# Patient Record
Sex: FEMALE | Race: WHITE | NOT HISPANIC OR LATINO | Employment: UNEMPLOYED | URBAN - METROPOLITAN AREA
[De-identification: names, ages, dates, MRNs, and addresses within clinical notes are randomized per-mention and may not be internally consistent; named-entity substitution may affect disease eponyms.]

---

## 2017-05-27 ENCOUNTER — APPOINTMENT (OUTPATIENT)
Dept: RADIOLOGY | Facility: MEDICAL CENTER | Age: 27
End: 2017-05-27
Attending: EMERGENCY MEDICINE
Payer: COMMERCIAL

## 2017-05-27 ENCOUNTER — HOSPITAL ENCOUNTER (EMERGENCY)
Facility: MEDICAL CENTER | Age: 27
End: 2017-05-27
Attending: EMERGENCY MEDICINE
Payer: COMMERCIAL

## 2017-05-27 VITALS
HEART RATE: 71 BPM | BODY MASS INDEX: 18.61 KG/M2 | DIASTOLIC BLOOD PRESSURE: 76 MMHG | TEMPERATURE: 98.4 F | SYSTOLIC BLOOD PRESSURE: 104 MMHG | WEIGHT: 94.8 LBS | OXYGEN SATURATION: 99 % | RESPIRATION RATE: 16 BRPM | HEIGHT: 60 IN

## 2017-05-27 DIAGNOSIS — S09.90XA CHI (CLOSED HEAD INJURY), INITIAL ENCOUNTER: ICD-10-CM

## 2017-05-27 DIAGNOSIS — S16.1XXA NECK STRAIN, INITIAL ENCOUNTER: ICD-10-CM

## 2017-05-27 LAB — HCG UR QL: NEGATIVE

## 2017-05-27 PROCEDURE — 73501 X-RAY EXAM HIP UNI 1 VIEW: CPT | Mod: RT

## 2017-05-27 PROCEDURE — 72125 CT NECK SPINE W/O DYE: CPT

## 2017-05-27 PROCEDURE — 90715 TDAP VACCINE 7 YRS/> IM: CPT | Performed by: EMERGENCY MEDICINE

## 2017-05-27 PROCEDURE — 81025 URINE PREGNANCY TEST: CPT

## 2017-05-27 PROCEDURE — 700111 HCHG RX REV CODE 636 W/ 250 OVERRIDE (IP): Performed by: EMERGENCY MEDICINE

## 2017-05-27 PROCEDURE — 70450 CT HEAD/BRAIN W/O DYE: CPT

## 2017-05-27 PROCEDURE — 99284 EMERGENCY DEPT VISIT MOD MDM: CPT

## 2017-05-27 PROCEDURE — 90471 IMMUNIZATION ADMIN: CPT

## 2017-05-27 RX ORDER — OXYCODONE HYDROCHLORIDE AND ACETAMINOPHEN 5; 325 MG/1; MG/1
1-2 TABLET ORAL EVERY 4 HOURS PRN
Qty: 15 TAB | Refills: 0 | Status: SHIPPED | OUTPATIENT
Start: 2017-05-27

## 2017-05-27 RX ADMIN — CLOSTRIDIUM TETANI TOXOID ANTIGEN (FORMALDEHYDE INACTIVATED), CORYNEBACTERIUM DIPHTHERIAE TOXOID ANTIGEN (FORMALDEHYDE INACTIVATED), BORDETELLA PERTUSSIS TOXOID ANTIGEN (GLUTARALDEHYDE INACTIVATED), BORDETELLA PERTUSSIS FILAMENTOUS HEMAGGLUTININ ANTIGEN (FORMALDEHYDE INACTIVATED), BORDETELLA PERTUSSIS PERTACTIN ANTIGEN, AND BORDETELLA PERTUSSIS FIMBRIAE 2/3 ANTIGEN 0.5 ML: 5; 2; 2.5; 5; 3; 5 INJECTION, SUSPENSION INTRAMUSCULAR at 22:20

## 2017-05-27 ASSESSMENT — PAIN SCALES - GENERAL: PAINLEVEL_OUTOF10: 8

## 2017-05-27 NOTE — ED AVS SNAPSHOT
Home Care Instructions                                                                                                                Alli Razo   MRN: [id number]    Department:  [facility], Emergency Dept   Date of Visit:  5/27/2017            [facility], Emergency Dept    [address] [place] [id number]    Phone:  [phone]      You were seen by     Ricardo Crowder M.D.      Your Diagnosis Was     CHI (closed head injury), initial encounter     S09.90XA       These are the medications you received during your hospitalization from 05/27/2017 2007 to 05/27/2017 2321     Date/Time Order Dose Route Action    05/27/2017 2220 tetanus-dipth-acell pertussis (ADACEL) inj 0.5 mL 0.5 mL Intramuscular Given      Follow-up Information     1. Follow up with [facility], Emergency Dept In 2 days.    Specialty:  Emergency Medicine    Why:  As needed, If symptoms worsen    Contact information    [address] [place] [id number] [phone]      Medication Information     Review all of your home medications and newly ordered medications with your primary doctor and/or pharmacist as soon as possible. Follow medication instructions as directed by your doctor and/or pharmacist.     Please keep your complete medication list with you and share with your physician. Update the information when medications are discontinued, doses are changed, or new medications (including over-the-counter products) are added; and carry medication information at all times in the event of emergency situations.               Medication List      START taking these medications        Instructions    Morning Afternoon Evening Bedtime    oxycodone-acetaminophen 5-325 MG Tabs   Commonly known as:  PERCOCET        Take 1-2 Tabs by mouth every four hours as needed (pain).   Dose:  1-2 Tab                          ASK your doctor about these medications        Instructions    Morning Afternoon Evening Bedtime    NON SPECIFIED        Indications: primrose                             Where to Get Your Medications      You can get these medications from any pharmacy     Bring a paper prescription for each of these medications    - oxycodone-acetaminophen 5-325 MG Tabs            Procedures and tests performed during your visit     CT-CSPINE WITHOUT PLUS RECONS    CT-HEAD W/O    DX-HIP-UNILATERAL-WITH PELVIS-1 VIEW RIGHT    POC URINE PREGNANCY        Discharge Instructions       Cervical Sprain  A cervical sprain is an injury in the neck in which the strong, fibrous tissues (ligaments) that connect your neck bones stretch or tear. Cervical sprains can range from mild to severe. Severe cervical sprains can cause the neck vertebrae to be unstable. This can lead to damage of the spinal cord and can result in serious nervous system problems. The amount of time it takes for a cervical sprain to get better depends on the cause and extent of the injury. Most cervical sprains heal in 1 to 3 weeks.  CAUSES   Severe cervical sprains may be caused by:   · Contact sport injuries (such as from football, rugby, wrestling, hockey, auto racing, gymnastics, diving, martial arts, or boxing).    · Motor vehicle collisions.    · Whiplash injuries. This is an injury from a sudden forward and backward whipping movement of the head and neck.   · Falls.    Mild cervical sprains may be caused by:   · Being in an awkward position, such as while cradling a telephone between your ear and shoulder.    · Sitting in a chair that does not offer proper support.    · Working at a poorly designed computer station.    · Looking up or down for long periods of time.    SYMPTOMS   · Pain, soreness, stiffness, or a burning sensation in the front, back, or sides of the neck. This discomfort may develop immediately after the injury or slowly, 24 hours or more after the injury.    · Pain or tenderness directly in the middle of the  back of the neck.    · Shoulder or upper back pain.    · Limited ability to move the neck.    · Headache.    · Dizziness.    · Weakness, numbness, or tingling in the hands or arms.    · Muscle spasms.    · Difficulty swallowing or chewing.    · Tenderness and swelling of the neck.    DIAGNOSIS   Most of the time your health care provider can diagnose a cervical sprain by taking your history and doing a physical exam. Your health care provider will ask about previous neck injuries and any known neck problems, such as arthritis in the neck. X-rays may be taken to find out if there are any other problems, such as with the bones of the neck. Other tests, such as a CT scan or MRI, may also be needed.   TREATMENT   Treatment depends on the severity of the cervical sprain. Mild sprains can be treated with rest, keeping the neck in place (immobilization), and pain medicines. Severe cervical sprains are immediately immobilized. Further treatment is done to help with pain, muscle spasms, and other symptoms and may include:  · Medicines, such as pain relievers, numbing medicines, or muscle relaxants.    · Physical therapy. This may involve stretching exercises, strengthening exercises, and posture training. Exercises and improved posture can help stabilize the neck, strengthen muscles, and help stop symptoms from returning.    HOME CARE INSTRUCTIONS   · Put ice on the injured area.    ¨ Put ice in a plastic bag.    ¨ Place a towel between your skin and the bag.    ¨ Leave the ice on for 15-20 minutes, 3-4 times a day.    · If your injury was severe, you may have been given a cervical collar to wear. A cervical collar is a two-piece collar designed to keep your neck from moving while it heals.  ¨ Do not remove the collar unless instructed by your health care provider.  ¨ If you have long hair, keep it outside of the collar.  ¨ Ask your health care provider before making any adjustments to your collar. Minor adjustments may be  required over time to improve comfort and reduce pressure on your chin or on the back of your head.  ¨ If you are allowed to remove the collar for cleaning or bathing, follow your health care provider's instructions on how to do so safely.  ¨ Keep your collar clean by wiping it with mild soap and water and drying it completely. If the collar you have been given includes removable pads, remove them every 1-2 days and hand wash them with soap and water. Allow them to air dry. They should be completely dry before you wear them in the collar.  ¨ If you are allowed to remove the collar for cleaning and bathing, wash and dry the skin of your neck. Check your skin for irritation or sores. If you see any, tell your health care provider.  ¨ Do not drive while wearing the collar.    · Only take over-the-counter or prescription medicines for pain, discomfort, or fever as directed by your health care provider.    · Keep all follow-up appointments as directed by your health care provider.    · Keep all physical therapy appointments as directed by your health care provider.    · Make any needed adjustments to your workstation to promote good posture.    · Avoid positions and activities that make your symptoms worse.    · Warm up and stretch before being active to help prevent problems.    SEEK MEDICAL CARE IF:   · Your pain is not controlled with medicine.    · You are unable to decrease your pain medicine over time as planned.    · Your activity level is not improving as expected.    SEEK IMMEDIATE MEDICAL CARE IF:   · You develop any bleeding.  · You develop stomach upset.  · You have signs of an allergic reaction to your medicine.    · Your symptoms get worse.    · You develop new, unexplained symptoms.    · You have numbness, tingling, weakness, or paralysis in any part of your body.    MAKE SURE YOU:   · Understand these instructions.  · Will watch your condition.  · Will get help right away if you are not doing well or get  worse.     This information is not intended to replace advice given to you by your health care provider. Make sure you discuss any questions you have with your health care provider.     Document Released: 10/14/2008 Document Revised: 12/23/2014 Document Reviewed: 06/25/2014  [employer] Interactive Patient Education ©2016 [employer]    Concussion, Adult  A concussion, or closed-head injury, is a brain injury caused by a direct blow to the head or by a quick and sudden movement (jolt) of the head or neck. Concussions are usually not life-threatening. Even so, the effects of a concussion can be serious. If you have had a concussion before, you are more likely to experience concussion-like symptoms after a direct blow to the head.   CAUSES  · Direct blow to the head, such as from running into another player during a soccer game, being hit in a fight, or hitting your head on a hard surface.  · A jolt of the head or neck that causes the brain to move back and forth inside the skull, such as in a car crash.  SIGNS AND SYMPTOMS  The signs of a concussion can be hard to notice. Early on, they may be missed by you, family members, and health care providers. You may look fine but act or feel differently.  Symptoms are usually temporary, but they may last for days, weeks, or even longer. Some symptoms may appear right away while others may not show up for hours or days. Every head injury is different. Symptoms include:  · Mild to moderate headaches that will not go away.  · A feeling of pressure inside your head.  · Having more trouble than usual:  ¨ Learning or remembering things you have heard.  ¨ Answering questions.  ¨ Paying attention or concentrating.  ¨ Organizing daily tasks.  ¨ Making decisions and solving problems.  · Slowness in thinking, acting or reacting, speaking, or reading.  · Getting lost or being easily confused.  · Feeling tired all the time or lacking energy (fatigued).  · Feeling drowsy.  · Sleep  disturbances.  ¨ Sleeping more than usual.  ¨ Sleeping less than usual.  ¨ Trouble falling asleep.  ¨ Trouble sleeping (insomnia).  · Loss of balance or feeling lightheaded or dizzy.  · Nausea or vomiting.  · Numbness or tingling.  · Increased sensitivity to:  ¨ Sounds.  ¨ Lights.  ¨ Distractions.  · Vision problems or eyes that tire easily.  · Diminished sense of taste or smell.  · Ringing in the ears.  · Mood changes such as feeling sad or anxious.  · Becoming easily irritated or angry for little or no reason.  · Lack of motivation.  · Seeing or hearing things other people do not see or hear (hallucinations).  DIAGNOSIS  Your health care provider can usually diagnose a concussion based on a description of your injury and symptoms. He or she will ask whether you passed out (lost consciousness) and whether you are having trouble remembering events that happened right before and during your injury.  Your evaluation might include:  · A brain scan to look for signs of injury to the brain. Even if the test shows no injury, you may still have a concussion.  · Blood tests to be sure other problems are not present.  TREATMENT  · Concussions are usually treated in an emergency department, in urgent care, or at a clinic. You may need to stay in the hospital overnight for further treatment.  · Tell your health care provider if you are taking any medicines, including prescription medicines, over-the-counter medicines, and natural remedies. Some medicines, such as blood thinners (anticoagulants) and aspirin, may increase the chance of complications. Also tell your health care provider whether you have had alcohol or are taking illegal drugs. This information may affect treatment.  · Your health care provider will send you home with important instructions to follow.  · How fast you will recover from a concussion depends on many factors. These factors include how severe your concussion is, what part of your brain was injured,  your age, and how healthy you were before the concussion.  · Most people with mild injuries recover fully. Recovery can take time. In general, recovery is slower in older persons. Also, persons who have had a concussion in the past or have other medical problems may find that it takes longer to recover from their current injury.  HOME CARE INSTRUCTIONS  General Instructions  · Carefully follow the directions your health care provider gave you.  · Only take over-the-counter or prescription medicines for pain, discomfort, or fever as directed by your health care provider.  · Take only those medicines that your health care provider has approved.  · Do not drink alcohol until your health care provider says you are well enough to do so. Alcohol and certain other drugs may slow your recovery and can put you at risk of further injury.  · If it is harder than usual to remember things, write them down.  · If you are easily distracted, try to do one thing at a time. For example, do not try to watch TV while fixing dinner.  · Talk with family members or close friends when making important decisions.  · Keep all follow-up appointments. Repeated evaluation of your symptoms is recommended for your recovery.  · Watch your symptoms and tell others to do the same. Complications sometimes occur after a concussion. Older adults with a brain injury may have a higher risk of serious complications, such as a blood clot on the brain.  · Tell your teachers, school nurse, school counselor, , , or  about your injury, symptoms, and restrictions. Tell them about what you can or cannot do. They should watch for:  ¨ Increased problems with attention or concentration.  ¨ Increased difficulty remembering or learning new information.  ¨ Increased time needed to complete tasks or assignments.  ¨ Increased irritability or decreased ability to cope with stress.  ¨ Increased symptoms.  · Rest. Rest helps the brain to  heal. Make sure you:  ¨ Get plenty of sleep at night. Avoid staying up late at night.  ¨ Keep the same bedtime hours on weekends and weekdays.  ¨ Rest during the day. Take daytime naps or rest breaks when you feel tired.  · Limit activities that require a lot of thought or concentration. These include:  ¨ Doing homework or job-related work.  ¨ Watching TV.  ¨ Working on the computer.  · Avoid any situation where there is potential for another head injury (football, hockey, soccer, basketball, martial arts, downhill snow sports and horseback riding). Your condition will get worse every time you experience a concussion. You should avoid these activities until you are evaluated by the appropriate follow-up health care providers.  Returning To Your Regular Activities  You will need to return to your normal activities slowly, not all at once. You must give your body and brain enough time for recovery.  · Do not return to sports or other athletic activities until your health care provider tells you it is safe to do so.  · Ask your health care provider when you can drive, ride a bicycle, or operate heavy machinery. Your ability to react may be slower after a brain injury. Never do these activities if you are dizzy.  · Ask your health care provider about when you can return to work or school.  Preventing Another Concussion  It is very important to avoid another brain injury, especially before you have recovered. In rare cases, another injury can lead to permanent brain damage, brain swelling, or death. The risk of this is greatest during the first 7-10 days after a head injury. Avoid injuries by:  · Wearing a seat belt when riding in a car.  · Drinking alcohol only in moderation.  · Wearing a helmet when biking, skiing, skateboarding, skating, or doing similar activities.  · Avoiding activities that could lead to a second concussion, such as contact or recreational sports, until your health care provider says it is  okay.  · Taking safety measures in your home.  ¨ Remove clutter and tripping hazards from floors and stairways.  ¨ Use grab bars in bathrooms and handrails by stairs.  ¨ Place non-slip mats on floors and in bathtubs.  ¨ Improve lighting in dim areas.  SEEK MEDICAL CARE IF:  · You have increased problems paying attention or concentrating.  · You have increased difficulty remembering or learning new information.  · You need more time to complete tasks or assignments than before.  · You have increased irritability or decreased ability to cope with stress.  · You have more symptoms than before.  Seek medical care if you have any of the following symptoms for more than 2 weeks after your injury:  · Lasting (chronic) headaches.  · Dizziness or balance problems.  · Nausea.  · Vision problems.  · Increased sensitivity to noise or light.  · Depression or mood swings.  · Anxiety or irritability.  · Memory problems.  · Difficulty concentrating or paying attention.  · Sleep problems.  · Feeling tired all the time.  SEEK IMMEDIATE MEDICAL CARE IF:  · You have severe or worsening headaches. These may be a sign of a blood clot in the brain.  · You have weakness (even if only in one hand, leg, or part of the face).  · You have numbness.  · You have decreased coordination.  · You vomit repeatedly.  · You have increased sleepiness.  · One pupil is larger than the other.  · You have convulsions.  · You have slurred speech.  · You have increased confusion. This may be a sign of a blood clot in the brain.  · You have increased restlessness, agitation, or irritability.  · You are unable to recognize people or places.  · You have neck pain.  · It is difficult to wake you up.  · You have unusual behavior changes.  · You lose consciousness.  MAKE SURE YOU:  · Understand these instructions.  · Will watch your condition.  · Will get help right away if you are not doing well or get worse.     This information is not intended to replace advice  given to you by your health care provider. Make sure you discuss any questions you have with your health care provider.     Document Released: 03/09/2005 Document Revised: 01/08/2016 Document Reviewed: 07/10/2014  [employer] Interactive Patient Education ©2016 [employer]            Patient Information     Patient Information    Following emergency treatment: all patient requiring follow-up care must return either to a private physician or a clinic if your condition worsens before you are able to obtain further medical attention, please return to the emergency room.     Billing Information    At [facility], we work to make the billing process streamlined for our patients.  Our Representatives are here to answer any questions you may have regarding your hospital bill.  If you have insurance coverage and have supplied your insurance information to us, we will submit a claim to your insurer on your behalf.  Should you have any questions regarding your bill, we can be reached online or by phone as follows:  Online: You are able pay your bills online or live chat with our representatives about any billing questions you may have. We are here to help Monday - Friday from 8:00am to 7:30pm and 9:00am - 12:00pm on Saturdays.  Please visit [email or web address]  for more information.   Phone:  [phone] or [phone]    Please note that your emergency physician, surgeon, pathologist, radiologist, anesthesiologist, and other specialists are not employed by [facility] and will therefore bill separately for their services.  Please contact them directly for any questions concerning their bills at the numbers below:     Emergency Physician Services:  [phone]  [facility]:  [phone]  [facility]:  [phone]  [facility]:  [phone]    1. Your final bill may vary from the amount quoted upon discharge if all procedures are not  complete at that time, or if your doctor has additional procedures of which we are not aware. You will receive an additional bill if you return to the Emergency Department at [facility] for suture removal regardless of the facility of which the sutures were placed.     2. Please arrange for settlement of this account at the emergency registration.    3. All self-pay accounts are due in full at the time of treatment.  If you are unable to meet this obligation then payment is expected within 4-5 days.     4. If you have had radiology studies (CT, X-ray, Ultrasound, MRI), you have received a preliminary result during your emergency department visit. Please contact the radiology department [phone] to receive a copy of your final result. Please discuss the Final result with your primary physician or with the follow up physician provided.     Crisis Hotline:  [place] Crisis Hotline:  0-846-GCQZOEU or 1-435.160.7753  [place] Crisis Hotline:    1-567.317.9176 or 746-884-3636         ED Discharge Follow Up Questions    1. In order to provide you with very good care, we would like to follow up with a phone call in the next few days.  May we have your permission to contact you?     YES /  NO    2. What is the best phone number to call you? (       )_____-__________    3. What is the best time to call you?      Morning  /  Afternoon  /  Evening                   Patient Signature:  ____________________________________________________________    Date:  ____________________________________________________________

## 2017-05-27 NOTE — ED AVS SNAPSHOT
5/27/2017    Alli Razo  [address]  [place] [address]    Dear Alli:    [facility] wants to ensure your discharge home is safe and you or your loved ones have had all of your questions answered regarding your care after you leave the hospital.    Below is a list of resources and contact information should you have any questions regarding your hospital stay, follow-up instructions, or active medical symptoms.    Questions or Concerns Regarding… Contact   Medical Questions Related to Your Discharge  (7 days a week, 8am-5pm) Contact a Nurse Care Coordinator   [phone]   Medical Questions Not Related to Your Discharge  (24 hours a day / 7 days a week)  Contact the Nurse Health Line   [phone]    Medications or Discharge Instructions Refer to your discharge packet   or contact your [facility] Primary Care Provider   [phone]   Follow-up Appointment(s) Schedule your appointment via Lootsie   or contact Scheduling [phone]   Billing Review your statement via Lootsie  or contact Billing [phone]   Medical Records Review your records via Lootsie   or contact Medical Records [phone]     You may receive a telephone call within two days of discharge. This call is to make certain you understand your discharge instructions and have the opportunity to have any questions answered. You can also easily access your medical information, test results and upcoming appointments via the Lootsie free online health management tool. You can learn more and sign up at [email or web address]. For assistance setting up your Lootsie account, please call [phone].    Once again, we want to ensure your discharge home is safe and that you have a clear understanding of any next steps in your care. If you have any questions or concerns, please do not hesitate to contact us, we are here for you. Thank you for choosing [facility] for your healthcare needs.    Sincerely,    Your [facility] Healthcare Team

## 2017-05-27 NOTE — ED AVS SNAPSHOT
[facility] Access Code: [email or web address]  Expires: 6/26/2017 11:21 PM    Your email address is not on file at [facility].  Email Addresses are required for you to sign up for [facility], please contact [phone] to verify your personal information and to provide your email address prior to attempting to register for [facility].    Alli Razo  [address]  [place] [address]    [facility]  A secure, online tool to manage your health information     [facility]’s [facility]® is a secure, online tool that connects you to your personalized health information from the privacy of your home -- day or night - making it very easy for you to manage your healthcare. Once the activation process is completed, you can even access your medical information using the [facility] catherine, which is available for free in the Apple Catherine store or Google Play store.     To learn more about [facility], visit [email or web address]    There are two levels of access available (as shown below):   My Chart Features  [facility] Primary Care Doctor [facility]  Specialists [facility]  Urgent  Care Non-[facility] Primary Care Doctor   Email your healthcare team securely and privately 24/7 X X X    Manage appointments: schedule your next appointment; view details of past/upcoming appointments X      Request prescription refills. X      View recent personal medical records, including lab and immunizations X X X X   View health record, including health history, allergies, medications X X X X   Read reports about your outpatient visits, procedures, consult and ER notes X X X X   See your discharge summary, which is a recap of your hospital and/or ER visit that includes your diagnosis, lab results, and care plan X X  X     How to register for [facility]:  Once your e-mail address has been verified, follow the following steps to sign up for [facility].     1. Go to  [email or web address]  2. Click on the Sign Up Now box, which takes you to the New Member Sign Up page.  You will need to provide the following information:  a. Enter your [facility] Access Code exactly as it appears at the top of this page. (You will not need to use this code after you’ve completed the sign-up process. If you do not sign up before the expiration date, you must request a new code.)   b. Enter your date of birth.   c. Enter your home email address.   d. Click Submit, and follow the next screen’s instructions.  3. Create a [employer] 911t ID. This will be your [facility] login ID and cannot be changed, so think of one that is secure and easy to remember.  4. Create a [facility] password. You can change your password at any time.  5. Enter your Password Reset Question and Answer. This can be used at a later time if you forget your password.   6. Enter your e-mail address. This allows you to receive e-mail notifications when new information is available in [facility].  7. Click Sign Up. You can now view your health information.    For assistance activating your [facility] account, call [phone]

## 2017-05-28 NOTE — DISCHARGE INSTRUCTIONS
Cervical Sprain  A cervical sprain is an injury in the neck in which the strong, fibrous tissues (ligaments) that connect your neck bones stretch or tear. Cervical sprains can range from mild to severe. Severe cervical sprains can cause the neck vertebrae to be unstable. This can lead to damage of the spinal cord and can result in serious nervous system problems. The amount of time it takes for a cervical sprain to get better depends on the cause and extent of the injury. Most cervical sprains heal in 1 to 3 weeks.  CAUSES   Severe cervical sprains may be caused by:   · Contact sport injuries (such as from football, rugby, wrestling, hockey, auto racing, gymnastics, diving, martial arts, or boxing).    · Motor vehicle collisions.    · Whiplash injuries. This is an injury from a sudden forward and backward whipping movement of the head and neck.   · Falls.    Mild cervical sprains may be caused by:   · Being in an awkward position, such as while cradling a telephone between your ear and shoulder.    · Sitting in a chair that does not offer proper support.    · Working at a poorly designed computer station.    · Looking up or down for long periods of time.    SYMPTOMS   · Pain, soreness, stiffness, or a burning sensation in the front, back, or sides of the neck. This discomfort may develop immediately after the injury or slowly, 24 hours or more after the injury.    · Pain or tenderness directly in the middle of the back of the neck.    · Shoulder or upper back pain.    · Limited ability to move the neck.    · Headache.    · Dizziness.    · Weakness, numbness, or tingling in the hands or arms.    · Muscle spasms.    · Difficulty swallowing or chewing.    · Tenderness and swelling of the neck.    DIAGNOSIS   Most of the time your health care provider can diagnose a cervical sprain by taking your history and doing a physical exam. Your health care provider will ask about previous neck injuries and any known neck  problems, such as arthritis in the neck. X-rays may be taken to find out if there are any other problems, such as with the bones of the neck. Other tests, such as a CT scan or MRI, may also be needed.   TREATMENT   Treatment depends on the severity of the cervical sprain. Mild sprains can be treated with rest, keeping the neck in place (immobilization), and pain medicines. Severe cervical sprains are immediately immobilized. Further treatment is done to help with pain, muscle spasms, and other symptoms and may include:  · Medicines, such as pain relievers, numbing medicines, or muscle relaxants.    · Physical therapy. This may involve stretching exercises, strengthening exercises, and posture training. Exercises and improved posture can help stabilize the neck, strengthen muscles, and help stop symptoms from returning.    HOME CARE INSTRUCTIONS   · Put ice on the injured area.    ¨ Put ice in a plastic bag.    ¨ Place a towel between your skin and the bag.    ¨ Leave the ice on for 15-20 minutes, 3-4 times a day.    · If your injury was severe, you may have been given a cervical collar to wear. A cervical collar is a two-piece collar designed to keep your neck from moving while it heals.  ¨ Do not remove the collar unless instructed by your health care provider.  ¨ If you have long hair, keep it outside of the collar.  ¨ Ask your health care provider before making any adjustments to your collar. Minor adjustments may be required over time to improve comfort and reduce pressure on your chin or on the back of your head.  ¨ If you are allowed to remove the collar for cleaning or bathing, follow your health care provider's instructions on how to do so safely.  ¨ Keep your collar clean by wiping it with mild soap and water and drying it completely. If the collar you have been given includes removable pads, remove them every 1-2 days and hand wash them with soap and water. Allow them to air dry. They should be completely  dry before you wear them in the collar.  ¨ If you are allowed to remove the collar for cleaning and bathing, wash and dry the skin of your neck. Check your skin for irritation or sores. If you see any, tell your health care provider.  ¨ Do not drive while wearing the collar.    · Only take over-the-counter or prescription medicines for pain, discomfort, or fever as directed by your health care provider.    · Keep all follow-up appointments as directed by your health care provider.    · Keep all physical therapy appointments as directed by your health care provider.    · Make any needed adjustments to your workstation to promote good posture.    · Avoid positions and activities that make your symptoms worse.    · Warm up and stretch before being active to help prevent problems.    SEEK MEDICAL CARE IF:   · Your pain is not controlled with medicine.    · You are unable to decrease your pain medicine over time as planned.    · Your activity level is not improving as expected.    SEEK IMMEDIATE MEDICAL CARE IF:   · You develop any bleeding.  · You develop stomach upset.  · You have signs of an allergic reaction to your medicine.    · Your symptoms get worse.    · You develop new, unexplained symptoms.    · You have numbness, tingling, weakness, or paralysis in any part of your body.    MAKE SURE YOU:   · Understand these instructions.  · Will watch your condition.  · Will get help right away if you are not doing well or get worse.     This information is not intended to replace advice given to you by your health care provider. Make sure you discuss any questions you have with your health care provider.     Document Released: 10/14/2008 Document Revised: 12/23/2014 Document Reviewed: 06/25/2014  [employer] Patient Education ©2016 [employer]    Concussion, Adult  A concussion, or closed-head injury, is a brain injury caused by a direct blow to the head or by a quick and sudden movement (jolt) of the head or  neck. Concussions are usually not life-threatening. Even so, the effects of a concussion can be serious. If you have had a concussion before, you are more likely to experience concussion-like symptoms after a direct blow to the head.   CAUSES  · Direct blow to the head, such as from running into another player during a soccer game, being hit in a fight, or hitting your head on a hard surface.  · A jolt of the head or neck that causes the brain to move back and forth inside the skull, such as in a car crash.  SIGNS AND SYMPTOMS  The signs of a concussion can be hard to notice. Early on, they may be missed by you, family members, and health care providers. You may look fine but act or feel differently.  Symptoms are usually temporary, but they may last for days, weeks, or even longer. Some symptoms may appear right away while others may not show up for hours or days. Every head injury is different. Symptoms include:  · Mild to moderate headaches that will not go away.  · A feeling of pressure inside your head.  · Having more trouble than usual:  ¨ Learning or remembering things you have heard.  ¨ Answering questions.  ¨ Paying attention or concentrating.  ¨ Organizing daily tasks.  ¨ Making decisions and solving problems.  · Slowness in thinking, acting or reacting, speaking, or reading.  · Getting lost or being easily confused.  · Feeling tired all the time or lacking energy (fatigued).  · Feeling drowsy.  · Sleep disturbances.  ¨ Sleeping more than usual.  ¨ Sleeping less than usual.  ¨ Trouble falling asleep.  ¨ Trouble sleeping (insomnia).  · Loss of balance or feeling lightheaded or dizzy.  · Nausea or vomiting.  · Numbness or tingling.  · Increased sensitivity to:  ¨ Sounds.  ¨ Lights.  ¨ Distractions.  · Vision problems or eyes that tire easily.  · Diminished sense of taste or smell.  · Ringing in the ears.  · Mood changes such as feeling sad or anxious.  · Becoming easily irritated or angry for little or no  reason.  · Lack of motivation.  · Seeing or hearing things other people do not see or hear (hallucinations).  DIAGNOSIS  Your health care provider can usually diagnose a concussion based on a description of your injury and symptoms. He or she will ask whether you passed out (lost consciousness) and whether you are having trouble remembering events that happened right before and during your injury.  Your evaluation might include:  · A brain scan to look for signs of injury to the brain. Even if the test shows no injury, you may still have a concussion.  · Blood tests to be sure other problems are not present.  TREATMENT  · Concussions are usually treated in an emergency department, in urgent care, or at a clinic. You may need to stay in the hospital overnight for further treatment.  · Tell your health care provider if you are taking any medicines, including prescription medicines, over-the-counter medicines, and natural remedies. Some medicines, such as blood thinners (anticoagulants) and aspirin, may increase the chance of complications. Also tell your health care provider whether you have had alcohol or are taking illegal drugs. This information may affect treatment.  · Your health care provider will send you home with important instructions to follow.  · How fast you will recover from a concussion depends on many factors. These factors include how severe your concussion is, what part of your brain was injured, your age, and how healthy you were before the concussion.  · Most people with mild injuries recover fully. Recovery can take time. In general, recovery is slower in older persons. Also, persons who have had a concussion in the past or have other medical problems may find that it takes longer to recover from their current injury.  HOME CARE INSTRUCTIONS  General Instructions  · Carefully follow the directions your health care provider gave you.  · Only take over-the-counter or prescription medicines for pain,  discomfort, or fever as directed by your health care provider.  · Take only those medicines that your health care provider has approved.  · Do not drink alcohol until your health care provider says you are well enough to do so. Alcohol and certain other drugs may slow your recovery and can put you at risk of further injury.  · If it is harder than usual to remember things, write them down.  · If you are easily distracted, try to do one thing at a time. For example, do not try to watch TV while fixing dinner.  · Talk with family members or close friends when making important decisions.  · Keep all follow-up appointments. Repeated evaluation of your symptoms is recommended for your recovery.  · Watch your symptoms and tell others to do the same. Complications sometimes occur after a concussion. Older adults with a brain injury may have a higher risk of serious complications, such as a blood clot on the brain.  · Tell your teachers, school nurse, school counselor, , , or  about your injury, symptoms, and restrictions. Tell them about what you can or cannot do. They should watch for:  ¨ Increased problems with attention or concentration.  ¨ Increased difficulty remembering or learning new information.  ¨ Increased time needed to complete tasks or assignments.  ¨ Increased irritability or decreased ability to cope with stress.  ¨ Increased symptoms.  · Rest. Rest helps the brain to heal. Make sure you:  ¨ Get plenty of sleep at night. Avoid staying up late at night.  ¨ Keep the same bedtime hours on weekends and weekdays.  ¨ Rest during the day. Take daytime naps or rest breaks when you feel tired.  · Limit activities that require a lot of thought or concentration. These include:  ¨ Doing homework or job-related work.  ¨ Watching TV.  ¨ Working on the computer.  · Avoid any situation where there is potential for another head injury (football, hockey, soccer, basketball, martial arts,  downhill snow sports and horseback riding). Your condition will get worse every time you experience a concussion. You should avoid these activities until you are evaluated by the appropriate follow-up health care providers.  Returning To Your Regular Activities  You will need to return to your normal activities slowly, not all at once. You must give your body and brain enough time for recovery.  · Do not return to sports or other athletic activities until your health care provider tells you it is safe to do so.  · Ask your health care provider when you can drive, ride a bicycle, or operate heavy machinery. Your ability to react may be slower after a brain injury. Never do these activities if you are dizzy.  · Ask your health care provider about when you can return to work or school.  Preventing Another Concussion  It is very important to avoid another brain injury, especially before you have recovered. In rare cases, another injury can lead to permanent brain damage, brain swelling, or death. The risk of this is greatest during the first 7-10 days after a head injury. Avoid injuries by:  · Wearing a seat belt when riding in a car.  · Drinking alcohol only in moderation.  · Wearing a helmet when biking, skiing, skateboarding, skating, or doing similar activities.  · Avoiding activities that could lead to a second concussion, such as contact or recreational sports, until your health care provider says it is okay.  · Taking safety measures in your home.  ¨ Remove clutter and tripping hazards from floors and stairways.  ¨ Use grab bars in bathrooms and handrails by stairs.  ¨ Place non-slip mats on floors and in bathtubs.  ¨ Improve lighting in dim areas.  SEEK MEDICAL CARE IF:  · You have increased problems paying attention or concentrating.  · You have increased difficulty remembering or learning new information.  · You need more time to complete tasks or assignments than before.  · You have increased irritability or  decreased ability to cope with stress.  · You have more symptoms than before.  Seek medical care if you have any of the following symptoms for more than 2 weeks after your injury:  · Lasting (chronic) headaches.  · Dizziness or balance problems.  · Nausea.  · Vision problems.  · Increased sensitivity to noise or light.  · Depression or mood swings.  · Anxiety or irritability.  · Memory problems.  · Difficulty concentrating or paying attention.  · Sleep problems.  · Feeling tired all the time.  SEEK IMMEDIATE MEDICAL CARE IF:  · You have severe or worsening headaches. These may be a sign of a blood clot in the brain.  · You have weakness (even if only in one hand, leg, or part of the face).  · You have numbness.  · You have decreased coordination.  · You vomit repeatedly.  · You have increased sleepiness.  · One pupil is larger than the other.  · You have convulsions.  · You have slurred speech.  · You have increased confusion. This may be a sign of a blood clot in the brain.  · You have increased restlessness, agitation, or irritability.  · You are unable to recognize people or places.  · You have neck pain.  · It is difficult to wake you up.  · You have unusual behavior changes.  · You lose consciousness.  MAKE SURE YOU:  · Understand these instructions.  · Will watch your condition.  · Will get help right away if you are not doing well or get worse.     This information is not intended to replace advice given to you by your health care provider. Make sure you discuss any questions you have with your health care provider.     Document Released: 03/09/2005 Document Revised: 01/08/2016 Document Reviewed: 07/10/2014  [employer] Interactive Patient Education ©2016 [employer]
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
## 2017-05-28 NOTE — ED NOTES
Pt ambulated with minimal assist to the restroom to provide urine sample, will send to lab for processing.

## 2017-05-28 NOTE — ED NOTES
Patient appropriate for discharge per ERP. Discussed discharge instructions, home care, and follow up with patient. Patient verbalized understanding. No distress noted.   Sig other providing ride home.